# Patient Record
Sex: MALE
[De-identification: names, ages, dates, MRNs, and addresses within clinical notes are randomized per-mention and may not be internally consistent; named-entity substitution may affect disease eponyms.]

---

## 2019-01-31 ENCOUNTER — HOSPITAL ENCOUNTER (OUTPATIENT)
Dept: HOSPITAL 92 - SCSULT | Age: 34
Discharge: HOME | End: 2019-01-31
Attending: FAMILY MEDICINE
Payer: COMMERCIAL

## 2019-01-31 DIAGNOSIS — R74.0: Primary | ICD-10-CM

## 2019-01-31 DIAGNOSIS — K76.0: ICD-10-CM

## 2019-01-31 PROCEDURE — 76700 US EXAM ABDOM COMPLETE: CPT

## 2019-01-31 NOTE — ULT
COMPLETE ABDOMINAL ULTRASOUND:

 

Date:  01/31/19 

 

HISTORY:  

Elevated liver function tests. 

 

FINDINGS:

There is some liver enlargement with coarse increased liver echogenicity consistent with fatty change
. There appears to be at least one small mobile gallstone without gallbladder wall thickening or zoë
cholecystic fluid. Common bile duct is 0.3 cm. No intrahepatic ductal dilatation. Visualized pancreas
, IVC, aorta, and spleen are unremarkable. No evidence for renal hydronephrosis. No abscess or abnorm
al fluid collection. 

 

IMPRESSION: 

Evidence for fatty changes in the liver. No evidence for common duct or intrahepatic ductal dilatatio
n. At least one mobile gallstone without gallbladder wall thickening or other evidence for acute chol
ecystitis. 

 

 

POS: [id number]

## 2022-05-20 ENCOUNTER — HOSPITAL ENCOUNTER (OUTPATIENT)
Dept: HOSPITAL 92 - BICULT | Age: 37
Discharge: HOME | End: 2022-05-20
Attending: FAMILY MEDICINE
Payer: COMMERCIAL

## 2022-05-20 DIAGNOSIS — K82.4: ICD-10-CM

## 2022-05-20 DIAGNOSIS — K76.0: ICD-10-CM

## 2022-05-20 DIAGNOSIS — R74.01: Primary | ICD-10-CM

## 2022-05-20 PROCEDURE — 76705 ECHO EXAM OF ABDOMEN: CPT
